# Patient Record
Sex: MALE | Race: WHITE | NOT HISPANIC OR LATINO | Employment: OTHER | URBAN - METROPOLITAN AREA
[De-identification: names, ages, dates, MRNs, and addresses within clinical notes are randomized per-mention and may not be internally consistent; named-entity substitution may affect disease eponyms.]

---

## 2021-03-17 ENCOUNTER — TRANSCRIBE ORDERS (OUTPATIENT)
Dept: PULMONOLOGY | Facility: CLINIC | Age: 46
End: 2021-03-17

## 2021-03-18 DIAGNOSIS — Z00.6 EXAMINATION FOR NORMAL COMPARISON FOR CLINICAL RESEARCH: Primary | ICD-10-CM

## 2021-04-06 ENCOUNTER — OFFICE VISIT (OUTPATIENT)
Dept: PULMONOLOGY | Facility: CLINIC | Age: 46
End: 2021-04-06

## 2021-04-06 ENCOUNTER — PREP FOR SURGERY (OUTPATIENT)
Dept: OTHER | Facility: HOSPITAL | Age: 46
End: 2021-04-06

## 2021-04-06 VITALS
TEMPERATURE: 98 F | BODY MASS INDEX: 26.26 KG/M2 | SYSTOLIC BLOOD PRESSURE: 136 MMHG | DIASTOLIC BLOOD PRESSURE: 94 MMHG | HEIGHT: 68 IN | WEIGHT: 173.25 LBS | OXYGEN SATURATION: 98 % | HEART RATE: 72 BPM | RESPIRATION RATE: 16 BRPM

## 2021-04-06 DIAGNOSIS — R06.00 DYSPNEA, UNSPECIFIED TYPE: Primary | ICD-10-CM

## 2021-04-06 DIAGNOSIS — R04.2 HEMOPTYSIS: Primary | ICD-10-CM

## 2021-04-06 DIAGNOSIS — J43.2 CENTRILOBULAR EMPHYSEMA (HCC): ICD-10-CM

## 2021-04-06 LAB
ALBUMIN SERPL-MCNC: 4.2 G/DL (ref 3.5–5.2)
ALBUMIN/GLOB SERPL: 1.6 G/DL
ALP SERPL-CCNC: 147 U/L (ref 39–117)
ALT SERPL W P-5'-P-CCNC: 10 U/L (ref 1–41)
ANION GAP SERPL CALCULATED.3IONS-SCNC: 9.4 MMOL/L (ref 5–15)
APTT PPP: 29.4 SECONDS (ref 22–39)
AST SERPL-CCNC: 14 U/L (ref 1–40)
BASOPHILS # BLD AUTO: 0.04 10*3/MM3 (ref 0–0.2)
BASOPHILS NFR BLD AUTO: 0.8 % (ref 0–1.5)
BILIRUB SERPL-MCNC: 0.4 MG/DL (ref 0–1.2)
BUN SERPL-MCNC: 7 MG/DL (ref 6–20)
BUN/CREAT SERPL: 8.2 (ref 7–25)
CALCIUM SPEC-SCNC: 9.5 MG/DL (ref 8.6–10.5)
CHLORIDE SERPL-SCNC: 100 MMOL/L (ref 98–107)
CO2 SERPL-SCNC: 27.6 MMOL/L (ref 22–29)
CREAT SERPL-MCNC: 0.85 MG/DL (ref 0.76–1.27)
DEPRECATED RDW RBC AUTO: 47.4 FL (ref 37–54)
EOSINOPHIL # BLD AUTO: 0.15 10*3/MM3 (ref 0–0.4)
EOSINOPHIL NFR BLD AUTO: 3 % (ref 0.3–6.2)
ERYTHROCYTE [DISTWIDTH] IN BLOOD BY AUTOMATED COUNT: 14.5 % (ref 12.3–15.4)
GFR SERPL CREATININE-BSD FRML MDRD: 97 ML/MIN/1.73
GLOBULIN UR ELPH-MCNC: 2.6 GM/DL
GLUCOSE SERPL-MCNC: 95 MG/DL (ref 65–99)
HCT VFR BLD AUTO: 46.2 % (ref 37.5–51)
HGB BLD-MCNC: 15.3 G/DL (ref 13–17.7)
IMM GRANULOCYTES # BLD AUTO: 0.02 10*3/MM3 (ref 0–0.05)
IMM GRANULOCYTES NFR BLD AUTO: 0.4 % (ref 0–0.5)
INR PPP: 0.98 (ref 0.85–1.16)
LYMPHOCYTES # BLD AUTO: 1.59 10*3/MM3 (ref 0.7–3.1)
LYMPHOCYTES NFR BLD AUTO: 31.3 % (ref 19.6–45.3)
MCH RBC QN AUTO: 29.9 PG (ref 26.6–33)
MCHC RBC AUTO-ENTMCNC: 33.1 G/DL (ref 31.5–35.7)
MCV RBC AUTO: 90.4 FL (ref 79–97)
MONOCYTES # BLD AUTO: 0.35 10*3/MM3 (ref 0.1–0.9)
MONOCYTES NFR BLD AUTO: 6.9 % (ref 5–12)
NEUTROPHILS NFR BLD AUTO: 2.93 10*3/MM3 (ref 1.7–7)
NEUTROPHILS NFR BLD AUTO: 57.6 % (ref 42.7–76)
NRBC BLD AUTO-RTO: 0 /100 WBC (ref 0–0.2)
NT-PROBNP SERPL-MCNC: 175.9 PG/ML (ref 0–450)
PLATELET # BLD AUTO: 288 10*3/MM3 (ref 140–450)
PMV BLD AUTO: 11.2 FL (ref 6–12)
POTASSIUM SERPL-SCNC: 4.6 MMOL/L (ref 3.5–5.2)
PROT SERPL-MCNC: 6.8 G/DL (ref 6–8.5)
PROTHROMBIN TIME: 12.7 SECONDS (ref 11.4–14.4)
RBC # BLD AUTO: 5.11 10*6/MM3 (ref 4.14–5.8)
SODIUM SERPL-SCNC: 137 MMOL/L (ref 136–145)
TSH SERPL DL<=0.05 MIU/L-ACNC: 1.82 UIU/ML (ref 0.27–4.2)
WBC # BLD AUTO: 5.08 10*3/MM3 (ref 3.4–10.8)

## 2021-04-06 PROCEDURE — 82104 ALPHA-1-ANTITRYPSIN PHENO: CPT | Performed by: INTERNAL MEDICINE

## 2021-04-06 PROCEDURE — 82103 ALPHA-1-ANTITRYPSIN TOTAL: CPT | Performed by: INTERNAL MEDICINE

## 2021-04-06 PROCEDURE — 85610 PROTHROMBIN TIME: CPT | Performed by: INTERNAL MEDICINE

## 2021-04-06 PROCEDURE — 83880 ASSAY OF NATRIURETIC PEPTIDE: CPT | Performed by: INTERNAL MEDICINE

## 2021-04-06 PROCEDURE — 80050 GENERAL HEALTH PANEL: CPT | Performed by: INTERNAL MEDICINE

## 2021-04-06 PROCEDURE — 85730 THROMBOPLASTIN TIME PARTIAL: CPT | Performed by: INTERNAL MEDICINE

## 2021-04-06 PROCEDURE — 99204 OFFICE O/P NEW MOD 45 MIN: CPT | Performed by: INTERNAL MEDICINE

## 2021-04-06 RX ORDER — ALBUTEROL SULFATE 90 UG/1
2 AEROSOL, METERED RESPIRATORY (INHALATION) EVERY 4 HOURS PRN
COMMUNITY

## 2021-04-06 RX ORDER — LIDOCAINE HYDROCHLORIDE 40 MG/ML
4 INJECTION, SOLUTION RETROBULBAR; TOPICAL ONCE
Status: CANCELLED | OUTPATIENT
Start: 2021-04-06 | End: 2021-04-06

## 2021-04-06 RX ORDER — SODIUM CHLORIDE 0.9 % (FLUSH) 0.9 %
3 SYRINGE (ML) INJECTION EVERY 12 HOURS SCHEDULED
Status: CANCELLED | OUTPATIENT
Start: 2021-04-06

## 2021-04-06 RX ORDER — SODIUM CHLORIDE 0.9 % (FLUSH) 0.9 %
10 SYRINGE (ML) INJECTION AS NEEDED
Status: CANCELLED | OUTPATIENT
Start: 2021-04-06

## 2021-04-06 RX ORDER — TRAMADOL HYDROCHLORIDE 50 MG/1
50 TABLET ORAL EVERY 6 HOURS PRN
COMMUNITY
Start: 2021-04-05

## 2021-04-06 RX ORDER — SODIUM CHLORIDE 9 MG/ML
125 INJECTION, SOLUTION INTRAVENOUS CONTINUOUS
Status: CANCELLED | OUTPATIENT
Start: 2021-04-06

## 2021-04-06 NOTE — PATIENT INSTRUCTIONS
1. Bronchoscopy and wash, with MAC. (in 1-2 weeks).  2. Lab work up today  3. Change Breo and Advair to Trelegy once a day  4. Continue Albuterol as needed  5. NO SMOKING  6. RTC after Bronch

## 2021-04-06 NOTE — PROGRESS NOTES
"Pulmonary Clinic  Initial Office Evaluation     REFERRING PHYSICIAN:  Patrick Saravia MD    Subjective   Reason for Visit:     Bimal is a 45 y.o. male here for evaluation of: Coughing Up Blood       HPI     History of Present Illness    He describes that for the last 4 years he has been coughing up \"black dots\", like pepper knots, and it has been getting worse over time, it used to happen once in a while, now maybe once a week and also the amount is more than before.    He also has cough usually nonproductive.    He also has dyspnea with activities.  He works as a  and he is exposed to wood dust.     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    He used to smoke up to 3 ppd, but now is down to 1 ppd and is trying to quit.     PMH: He  has a past medical history of Asthma, intrinsic and COPD (chronic obstructive pulmonary disease) (CMS/[facility]).    PSxH: He has a past surgical history that includes Bony pelvis surgery and Ankle surgery.        There is no immunization history on file for this patient.      Medications:     Current Outpatient Medications:   •  albuterol sulfate  (90 Base) MCG/ACT inhaler, Inhale 2 puffs Every 4 (Four) Hours As Needed for Wheezing., Disp: , Rfl:   •  traMADol (ULTRAM) 50 MG tablet, Take 50 mg by mouth Every 6 (Six) Hours As Needed., Disp: , Rfl:     Allergies: He is allergic to vicodin [hydrocodone-acetaminophen].    FH: He family history is not on file.    SH: He  reports that he has been smoking cigarettes. He has a 60.00 pack-year smoking history. He has never used smokeless tobacco. He reports current alcohol use of about 12.0 standard drinks of alcohol per week. He reports that he does not use drugs.         The following portions of the chart were reviewed this encounter and updated as appropriate.    History     Last Reviewed by Mal Serrano MD on 4/6/2021 at  2:36 PM    Sections Reviewed    Medical, Family, " "Tobacco, Surgical      Problem list reviewed by Mal Serrano MD on 4/6/2021 at  2:36 PM  Medicines reviewed by Mal Serrano MD on 4/6/2021 at  2:36 PM  Allergies reviewed by Mal Serrano MD on 4/6/2021 at  2:36 PM    ROS:   Review of Systems   Respiratory: Positive for cough, choking, chest tightness, shortness of breath and wheezing.        Objective   OBJECTIVE     Vitals:    04/06/21 1332   BP: 136/94   BP Location: Right arm   Patient Position: Sitting   Cuff Size: Adult   Pulse: 72   Resp: 16   Temp: 98 °F (36.7 °C)   SpO2: 98%  Comment: room air at rest   Weight: 78.6 kg (173 lb 4 oz)   Height: 172.7 cm (68\")     Body mass index is 26.34 kg/m².    Physical Examination    Constitutional:  No acute distress.   Neck: No JVD.   Cardiovascular: Normal rate, regular and rhythm.  No murmurs, gallop or rub.   Respiratory: Rhonchi.   Extremities: No Edema     Diagnostic Data    CT 11/23/20 - [place]  No acute cardiopulmonary disease  Bullous emphysematous changes in the lung apices  RUL 4 mm nodule  Calcified granulomas RML    SpO2 Readings from Last 3 Encounters:   04/06/21 98%      Assessment/Plan   ASSESSMENT / PLAN     Assessment:    1. Centrilobular emphysema  a. Tobacco use  2. RUL 4 mm nodule noted in CT 11/23/20  3. R/O Hemoptysis    Plan:  Orders Placed This Encounter   Procedures   • Comprehensive Metabolic Panel   • BNP   • TSH   • Alpha - 1 - Antitrypsin Phenotype   • aPTT   • Protime-INR   • CBC Auto Differential   • CBC & Differential     Patient Instructions   1. Bronchoscopy and wash, with MAC. (in 1-2 weeks).  2. Lab work up today  3. Change Breo and Advair to Trelegy once a day  4. Continue Albuterol as needed  5. NO SMOKING  6. RTC after Bronch    Return in about 3 weeks (around 4/27/2021).    I discussed the diagnosis, evaluation, and  treatment options with the patient and/or appropriate family members.    Thank you very much for allowing me to participate in the care of your " patient.    I spent 46 minutes on this date of service. This time includes time spent by me in the following activities:preparing for the visit, reviewing tests, obtaining and/or reviewing a separately obtained history, performing a medically appropriate examination, counseling the patient/family/caregiver, ordering medications, tests, or procedures, and/or documenting information in the medical record.    JOAQUIN.: Patrick Saravia MD

## 2021-04-06 NOTE — H&P (VIEW-ONLY)
"Pulmonary Clinic  Initial Office Evaluation     REFERRING PHYSICIAN:  Patrick Saravia MD    Subjective   Reason for Visit:     Bimal is a 45 y.o. male here for evaluation of: Coughing Up Blood       HPI     History of Present Illness    He describes that for the last 4 years he has been coughing up \"black dots\", like pepper knots, and it has been getting worse over time, it used to happen once in a while, now maybe once a week and also the amount is more than before.    He also have cough usually nonproductive.    He also has dyspnea with activities.  He works as a  and he is exposed to wood dust.     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    He used to smoke up to 3 ppd, but now is down to 1 ppd and is trying to quit.     PMH: He  has a past medical history of Asthma, intrinsic and COPD (chronic obstructive pulmonary disease) (CMS/[facility]).    PSxH: He has a past surgical history that includes Bony pelvis surgery and Ankle surgery.        There is no immunization history on file for this patient.      Medications:     Current Outpatient Medications:   •  albuterol sulfate  (90 Base) MCG/ACT inhaler, Inhale 2 puffs Every 4 (Four) Hours As Needed for Wheezing., Disp: , Rfl:   •  traMADol (ULTRAM) 50 MG tablet, Take 50 mg by mouth Every 6 (Six) Hours As Needed., Disp: , Rfl:     Allergies: He is allergic to vicodin [hydrocodone-acetaminophen].    FH: He family history is not on file.    SH: He  reports that he has been smoking cigarettes. He has a 60.00 pack-year smoking history. He has never used smokeless tobacco. He reports current alcohol use of about 12.0 standard drinks of alcohol per week. He reports that he does not use drugs.         The following portions of the chart were reviewed this encounter and updated as appropriate.    History     Last Reviewed by Mal Serrano MD on 4/6/2021 at  2:36 PM    Sections Reviewed    Medical, Family, " "Tobacco, Surgical      Problem list reviewed by Mal Serrano MD on 4/6/2021 at  2:36 PM  Medicines reviewed by Mal Serrano MD on 4/6/2021 at  2:36 PM  Allergies reviewed by Mal Serrano MD on 4/6/2021 at  2:36 PM    ROS:   Review of Systems   Respiratory: Positive for cough, choking, chest tightness, shortness of breath and wheezing.        Objective   OBJECTIVE     Vitals:    04/06/21 1332   BP: 136/94   BP Location: Right arm   Patient Position: Sitting   Cuff Size: Adult   Pulse: 72   Resp: 16   Temp: 98 °F (36.7 °C)   SpO2: 98%  Comment: room air at rest   Weight: 78.6 kg (173 lb 4 oz)   Height: 172.7 cm (68\")     Body mass index is 26.34 kg/m².    Physical Examination    Constitutional:  No acute distress.   Neck: No JVD.   Cardiovascular: Normal rate, regular and rhythm.  No murmurs, gallop or rub.   Respiratory: Rhonchi.   Extremities: No Edema     Diagnostic Data    CT 11/23/20 - [place]  No acute cardiopulmonary disease  Bullous emphysematous changes in the lung apices  RUL 4 mm nodule  Calcified granulomas RML    SpO2 Readings from Last 3 Encounters:   04/06/21 98%      Assessment/Plan   ASSESSMENT / PLAN     Assessment:    1. Centrilobular emphysema  a. Tobacco use  2. RUL 4 mm nodule noted in CT 11/23/20  3. R/O Hemoptysis    Plan:  Orders Placed This Encounter   Procedures   • Comprehensive Metabolic Panel   • BNP   • TSH   • Alpha - 1 - Antitrypsin Phenotype   • aPTT   • Protime-INR   • CBC Auto Differential   • CBC & Differential     Patient Instructions   1. Bronchoscopy and wash, with MAC. (in 1-2 weeks).  2. Lab work up today  3. Change Breo and Advair to Trelegy once a day  4. Continue Albuterol as needed  5. NO SMOKING  6. RTC after Bronch    Return in about 3 weeks (around 4/27/2021).    I discussed the diagnosis, evaluation, and  treatment options with the patient and/or appropriate family members.    Thank you very much for allowing me to participate in the care of your " patient.    I spent 46 minutes on this date of service. This time includes time spent by me in the following activities:preparing for the visit, reviewing tests, obtaining and/or reviewing a separately obtained history, performing a medically appropriate examination, counseling the patient/family/caregiver, ordering medications, tests, or procedures, and/or documenting information in the medical record.    JOAQUIN.: Patrick Saravia MD

## 2021-04-07 ENCOUNTER — TRANSCRIBE ORDERS (OUTPATIENT)
Dept: PULMONOLOGY | Facility: CLINIC | Age: 46
End: 2021-04-07

## 2021-04-07 ENCOUNTER — TELEPHONE (OUTPATIENT)
Dept: PULMONOLOGY | Facility: CLINIC | Age: 46
End: 2021-04-07

## 2021-04-07 PROBLEM — R04.2 HEMOPTYSIS: Status: ACTIVE | Noted: 2021-04-07

## 2021-04-13 ENCOUNTER — APPOINTMENT (OUTPATIENT)
Dept: PREADMISSION TESTING | Facility: HOSPITAL | Age: 46
End: 2021-04-13

## 2021-04-13 LAB
A1AT PHENOTYP SERPL IFE: NORMAL
A1AT SERPL-MCNC: 154 MG/DL (ref 101–187)

## 2021-04-13 PROCEDURE — U0004 COV-19 TEST NON-CDC HGH THRU: HCPCS

## 2021-04-13 PROCEDURE — C9803 HOPD COVID-19 SPEC COLLECT: HCPCS

## 2021-04-14 ENCOUNTER — PRE-ADMISSION TESTING (OUTPATIENT)
Dept: PREADMISSION TESTING | Facility: HOSPITAL | Age: 46
End: 2021-04-14

## 2021-04-14 VITALS — WEIGHT: 173.28 LBS | HEIGHT: 68 IN | BODY MASS INDEX: 26.26 KG/M2

## 2021-04-14 LAB — SARS-COV-2 RNA PNL SPEC NAA+PROBE: NOT DETECTED

## 2021-04-14 PROCEDURE — 93005 ELECTROCARDIOGRAM TRACING: CPT

## 2021-04-14 PROCEDURE — 93010 ELECTROCARDIOGRAM REPORT: CPT | Performed by: INTERNAL MEDICINE

## 2021-04-15 ENCOUNTER — ANESTHESIA EVENT (OUTPATIENT)
Dept: GASTROENTEROLOGY | Facility: HOSPITAL | Age: 46
End: 2021-04-15

## 2021-04-15 ENCOUNTER — ANESTHESIA (OUTPATIENT)
Dept: GASTROENTEROLOGY | Facility: HOSPITAL | Age: 46
End: 2021-04-15

## 2021-04-15 ENCOUNTER — HOSPITAL ENCOUNTER (OUTPATIENT)
Facility: HOSPITAL | Age: 46
Setting detail: HOSPITAL OUTPATIENT SURGERY
Discharge: HOME | End: 2021-04-15
Attending: INTERNAL MEDICINE | Admitting: INTERNAL MEDICINE

## 2021-04-15 VITALS
TEMPERATURE: 97 F | SYSTOLIC BLOOD PRESSURE: 119 MMHG | RESPIRATION RATE: 18 BRPM | DIASTOLIC BLOOD PRESSURE: 80 MMHG | OXYGEN SATURATION: 95 % | HEART RATE: 64 BPM

## 2021-04-15 DIAGNOSIS — R04.2 HEMOPTYSIS: ICD-10-CM

## 2021-04-15 LAB
B PARAPERT DNA SPEC QL NAA+PROBE: NOT DETECTED
B PERT DNA SPEC QL NAA+PROBE: NOT DETECTED
C PNEUM DNA NPH QL NAA+NON-PROBE: NOT DETECTED
FLUAV SUBTYP SPEC NAA+PROBE: NOT DETECTED
FLUBV RNA ISLT QL NAA+PROBE: NOT DETECTED
HADV DNA SPEC NAA+PROBE: NOT DETECTED
HCOV 229E RNA SPEC QL NAA+PROBE: NOT DETECTED
HCOV HKU1 RNA SPEC QL NAA+PROBE: NOT DETECTED
HCOV NL63 RNA SPEC QL NAA+PROBE: NOT DETECTED
HCOV OC43 RNA SPEC QL NAA+PROBE: NOT DETECTED
HMPV RNA NPH QL NAA+NON-PROBE: NOT DETECTED
HPIV1 RNA SPEC QL NAA+PROBE: NOT DETECTED
HPIV2 RNA SPEC QL NAA+PROBE: NOT DETECTED
HPIV3 RNA NPH QL NAA+PROBE: NOT DETECTED
HPIV4 P GENE NPH QL NAA+PROBE: NOT DETECTED
M PNEUMO IGG SER IA-ACNC: NOT DETECTED
QT INTERVAL: 382 MS
QTC INTERVAL: 403 MS
RHINOVIRUS RNA SPEC NAA+PROBE: NOT DETECTED
RSV RNA NPH QL NAA+NON-PROBE: NOT DETECTED

## 2021-04-15 PROCEDURE — 87102 FUNGUS ISOLATION CULTURE: CPT | Performed by: INTERNAL MEDICINE

## 2021-04-15 PROCEDURE — 87206 SMEAR FLUORESCENT/ACID STAI: CPT | Performed by: INTERNAL MEDICINE

## 2021-04-15 PROCEDURE — 87070 CULTURE OTHR SPECIMN AEROBIC: CPT | Performed by: INTERNAL MEDICINE

## 2021-04-15 PROCEDURE — 87116 MYCOBACTERIA CULTURE: CPT | Performed by: INTERNAL MEDICINE

## 2021-04-15 PROCEDURE — 87633 RESP VIRUS 12-25 TARGETS: CPT | Performed by: INTERNAL MEDICINE

## 2021-04-15 PROCEDURE — 87209 SMEAR COMPLEX STAIN: CPT | Performed by: INTERNAL MEDICINE

## 2021-04-15 PROCEDURE — 87177 OVA AND PARASITES SMEARS: CPT | Performed by: INTERNAL MEDICINE

## 2021-04-15 PROCEDURE — 0100U HC BIOFIRE FILMARRAY RESP PANEL 2: CPT | Performed by: INTERNAL MEDICINE

## 2021-04-15 PROCEDURE — 31622 DX BRONCHOSCOPE/WASH: CPT | Performed by: INTERNAL MEDICINE

## 2021-04-15 PROCEDURE — 88112 CYTOPATH CELL ENHANCE TECH: CPT | Performed by: INTERNAL MEDICINE

## 2021-04-15 PROCEDURE — 87205 SMEAR GRAM STAIN: CPT | Performed by: INTERNAL MEDICINE

## 2021-04-15 PROCEDURE — 25010000002 PROPOFOL 10 MG/ML EMULSION: Performed by: NURSE ANESTHETIST, CERTIFIED REGISTERED

## 2021-04-15 RX ORDER — FAMOTIDINE 10 MG/ML
20 INJECTION, SOLUTION INTRAVENOUS ONCE
Status: COMPLETED | OUTPATIENT
Start: 2021-04-15 | End: 2021-04-15

## 2021-04-15 RX ORDER — PROPOFOL 10 MG/ML
VIAL (ML) INTRAVENOUS AS NEEDED
Status: DISCONTINUED | OUTPATIENT
Start: 2021-04-15 | End: 2021-04-15 | Stop reason: SURG

## 2021-04-15 RX ORDER — DEXAMETHASONE SODIUM PHOSPHATE 4 MG/ML
8 INJECTION, SOLUTION INTRA-ARTICULAR; INTRALESIONAL; INTRAMUSCULAR; INTRAVENOUS; SOFT TISSUE ONCE AS NEEDED
Status: DISCONTINUED | OUTPATIENT
Start: 2021-04-15 | End: 2021-04-15 | Stop reason: HOSPADM

## 2021-04-15 RX ORDER — ONDANSETRON 2 MG/ML
4 INJECTION INTRAMUSCULAR; INTRAVENOUS ONCE AS NEEDED
Status: DISCONTINUED | OUTPATIENT
Start: 2021-04-15 | End: 2021-04-15 | Stop reason: HOSPADM

## 2021-04-15 RX ORDER — SODIUM CHLORIDE 0.9 % (FLUSH) 0.9 %
3 SYRINGE (ML) INJECTION EVERY 12 HOURS SCHEDULED
Status: DISCONTINUED | OUTPATIENT
Start: 2021-04-15 | End: 2021-04-15 | Stop reason: HOSPADM

## 2021-04-15 RX ORDER — FENTANYL CITRATE 50 UG/ML
50 INJECTION, SOLUTION INTRAMUSCULAR; INTRAVENOUS
Status: DISCONTINUED | OUTPATIENT
Start: 2021-04-15 | End: 2021-04-15 | Stop reason: HOSPADM

## 2021-04-15 RX ORDER — SODIUM CHLORIDE 0.9 % (FLUSH) 0.9 %
10 SYRINGE (ML) INJECTION AS NEEDED
Status: DISCONTINUED | OUTPATIENT
Start: 2021-04-15 | End: 2021-04-15 | Stop reason: HOSPADM

## 2021-04-15 RX ORDER — LIDOCAINE HYDROCHLORIDE 40 MG/ML
4 INJECTION, SOLUTION RETROBULBAR; TOPICAL ONCE
Status: COMPLETED | OUTPATIENT
Start: 2021-04-15 | End: 2021-04-15

## 2021-04-15 RX ORDER — PROPOFOL 10 MG/ML
VIAL (ML) INTRAVENOUS CONTINUOUS PRN
Status: DISCONTINUED | OUTPATIENT
Start: 2021-04-15 | End: 2021-04-15 | Stop reason: SURG

## 2021-04-15 RX ORDER — SODIUM CHLORIDE 9 MG/ML
125 INJECTION, SOLUTION INTRAVENOUS CONTINUOUS
Status: DISCONTINUED | OUTPATIENT
Start: 2021-04-15 | End: 2021-04-15 | Stop reason: HOSPADM

## 2021-04-15 RX ADMIN — PROPOFOL 50 MG: 10 INJECTION, EMULSION INTRAVENOUS at 14:17

## 2021-04-15 RX ADMIN — SODIUM CHLORIDE, POTASSIUM CHLORIDE, SODIUM LACTATE AND CALCIUM CHLORIDE 500 ML: 600; 310; 30; 20 INJECTION, SOLUTION INTRAVENOUS at 13:52

## 2021-04-15 RX ADMIN — PROPOFOL 50 MG: 10 INJECTION, EMULSION INTRAVENOUS at 14:11

## 2021-04-15 RX ADMIN — FAMOTIDINE 20 MG: 10 INJECTION INTRAVENOUS at 13:54

## 2021-04-15 RX ADMIN — LIDOCAINE HYDROCHLORIDE 4 ML: 40 INJECTION, SOLUTION RETROBULBAR; TOPICAL at 13:58

## 2021-04-15 RX ADMIN — PROPOFOL 50 MG: 10 INJECTION, EMULSION INTRAVENOUS at 14:13

## 2021-04-15 RX ADMIN — PROPOFOL 200 MCG/KG/MIN: 10 INJECTION, EMULSION INTRAVENOUS at 14:11

## 2021-04-15 RX ADMIN — PROPOFOL 50 MG: 10 INJECTION, EMULSION INTRAVENOUS at 14:09

## 2021-04-15 NOTE — ANESTHESIA PREPROCEDURE EVALUATION
Anesthesia Evaluation     Patient summary reviewed and Nursing notes reviewed   NPO Solid Status: > 8 hours  NPO Liquid Status: > 8 hours           Airway   Mallampati: I  TM distance: >3 FB  Neck ROM: full  No difficulty expected  Dental    (+) upper dentures    Pulmonary    (+) COPD, asthma,  (-) shortness of breath, recent URI  Cardiovascular     ECG reviewed    (-) hypertension, valvular problems/murmurs, past MI, dysrhythmias, angina, hyperlipidemia    ROS comment: ECG NSR     Neuro/Psych  (-) seizures, CVA  GI/Hepatic/Renal/Endo      Musculoskeletal     (+) back pain,   Abdominal    Substance History      OB/GYN          Other   arthritis,      ROS/Med Hx Other: Hemoptysis neg CT CXR  Recent work trauma 2020  inc pelvic Fx  [facility]                Anesthesia Plan    ASA 2     MAC   (TOPICAL PFL POM )  intravenous induction     Anesthetic plan, all risks, benefits, and alternatives have been provided, discussed and informed consent has been obtained with: patient.    Plan discussed with CRNA.

## 2021-04-15 NOTE — ANESTHESIA POSTPROCEDURE EVALUATION
Patient: Bimal Lion    Procedure Summary     Date: 04/15/21 Room / Location:  [facility]    Anesthesia Start: 1405 Anesthesia Stop: 1440    Procedure: BRONCHOSCOPY (N/A Bronchus) Diagnosis:       Hemoptysis      (Hemoptysis [R04.2])    Surgeons: Mal Serrano MD Provider: Russel Casper MD    Anesthesia Type: MAC ASA Status: 2          Anesthesia Type: MAC    Vitals  No vitals data found for the desired time range.          Post Anesthesia Care and Evaluation    Patient location during evaluation: PACU  Patient participation: complete - patient participated  Level of consciousness: responsive to verbal stimuli  Pain management: adequate  Airway patency: patent  Anesthetic complications: No anesthetic complications  PONV Status: none  Cardiovascular status: hemodynamically stable and acceptable  Respiratory status: nonlabored ventilation, acceptable and nasal cannula  Hydration status: acceptable

## 2021-04-15 NOTE — INTERVAL H&P NOTE
"  H&P reviewed. The patient was examined and there are no changes to the H&P.    Still coughing up \"black dots\".      "

## 2021-04-15 NOTE — OP NOTE
"BRONCHOSCOPY REPORT     Date: 04/15/21       Procedure(s): Bronchoscopy, Bronch wash   Surgeon: Mal Serrano MD   Scope In: 14:12   Scope Out: 14:26   Pre-Operative Diagnosis: Hemoptysis [R04.2]   Post-Operative Diagnosis: No source of hemoptysis found.  Black casts aspirated from right and left bronchial tree.   ASA and Mallampati: ASA Class II. Mallampati per Anesthesia   Time out: Immediately prior to procedure a \"time out\" was called to verify the correct patient, procedure, equipment, support staff and site/side as required.   Anesthesia: Monitored Anesthesia Care       Findings:    A fiberoptic bronchoscope was inserted into the main airway via the Oropahrynx.      An anatomical survey was done of the main airways and the subsegmental bronchus: including the Right Mainstem bronchus, the Right Upper Lobe bronchus, Bronchus Intermedius, Right Middle Lobe, Superior Segment of the Right Lower Lobe and the Basilar segments of the Right Lower Lobe.     Then, the scope was withdrawn back to the kelly and advanced into the Left Mainstem Bronchus, Left Upper Lobe bronchus, Lingula Bronchus, and the basilar segments of the Left Lower Lobe.    After that we proceeded to do a bronch wash in the RML and RLL and then in the L Mainstem and LLL. Bronch Wash return showed black particles.    Vocal Cords: Normal.   Main Tachea and Kelly: Normal.   Bronchial Tree: Normal mucosa       Estimated Blood Loss: 0 mL.   Medications: Per Anesthesia   Complications: None     Specimens:   BW BALF PSB [place] EBBx TBBx EBUS   Location R + L         Respiratory Cult. & GS ([id number]). X         Fungus smear (GMS) ([id number]) X         Fungus Culture ([id number]) X         AFB Smear and Culture ([id number]) X         Viral Culture, Rapid, Resp.  ([id number])          Respiratory Viral Panel PCR ([id number]) X         Ova and Parasite Examination ([id number]) X                   Non-Gyn Cytology (LAB13) X                   Pathology ([id number]) X       "       Pictures:     Kelly:   RUL:   R Bronch intermedius:       L Mainstem       LLL: During bronch was:                  Specimen trap:        Mal Serrano MD, FACP, FCCP, [facility]  Intensive Care Medicine and Pulmonary Medicine

## 2021-04-16 LAB
CYTO UR: NORMAL
GIE STN SPEC: NORMAL
GIE STN SPEC: NORMAL
LAB AP CASE REPORT: NORMAL
LAB AP CASE REPORT: NORMAL
LAB AP CLINICAL INFORMATION: NORMAL
PATH REPORT.FINAL DX SPEC: NORMAL
PATH REPORT.FINAL DX SPEC: NORMAL
PATH REPORT.GROSS SPEC: NORMAL

## 2021-04-17 LAB
BACTERIA SPEC RESP CULT: NORMAL
GRAM STN SPEC: NORMAL

## 2021-04-20 LAB
O+P SPEC MICRO: NORMAL
O+P STL TRI STN: NORMAL

## 2021-05-27 LAB
FUNGUS WND CULT: NORMAL
MYCOBACTERIUM SPEC CULT: NORMAL
NIGHT BLUE STAIN TISS: NORMAL

## (undated) DEVICE — SINGLE USE BIOPSY VALVE MAJ-210: Brand: SINGLE USE BIOPSY VALVE (STERILE)

## (undated) DEVICE — TRAP,MUCUS SPECIMEN,40CC: Brand: MEDLINE

## (undated) DEVICE — BOWL UTIL STRL 32OZ

## (undated) DEVICE — SPEC CONT WIDE MOUTH 3OZ 400/CS 20 CS/SK: Brand: MEDEGEN MEDICAL PRODUCTS, LLC

## (undated) DEVICE — SYR LUER SLPTP 50ML

## (undated) DEVICE — SYR SLP TP 10ML DISP

## (undated) DEVICE — SINGLE USE SUCTION VALVE MAJ-209: Brand: SINGLE USE SUCTION VALVE (STERILE)